# Patient Record
Sex: FEMALE | URBAN - METROPOLITAN AREA
[De-identification: names, ages, dates, MRNs, and addresses within clinical notes are randomized per-mention and may not be internally consistent; named-entity substitution may affect disease eponyms.]

---

## 2021-10-01 ENCOUNTER — OFFICE VISIT (OUTPATIENT)
Dept: URBAN - METROPOLITAN AREA CLINIC 56 | Facility: CLINIC | Age: 56
End: 2021-10-01

## 2021-10-01 DIAGNOSIS — D48.1 NEOPLASM OF UNCERTAIN BEHAVIOR OF CONNCTV/SOFT TISS: ICD-10-CM

## 2021-10-01 DIAGNOSIS — H02.422 MYOGENIC PTOSIS OF LEFT EYELID: Primary | ICD-10-CM

## 2021-10-01 DIAGNOSIS — Z41.1 ENCOUNTER FOR COSMETIC SURGERY: ICD-10-CM

## 2021-10-01 PROCEDURE — 92285 EXTERNAL OCULAR PHOTOGRAPHY: CPT | Performed by: OPHTHALMOLOGY

## 2021-10-01 PROCEDURE — 92081 LIMITED VISUAL FIELD XM: CPT | Performed by: OPHTHALMOLOGY

## 2021-10-01 NOTE — IMPRESSION/PLAN
Impression: Encounter for cosmetic surgery: Z41.1. 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 (42 years). PAtient does not currently have insurance, and I recommended that she acquire insurance prior to moving forward with surgery as this will lessen her out-of-pocket cost, especially if revision is needed, which frequently takes place in RGP-related upper eyelid ptosis. Patient explained that she will insurance and call to follow up.

## 2021-10-01 NOTE — IMPRESSION/PLAN
Impression: Myogenic ptosis of left eyelid: H02.422. 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 (42 years). PAtient does not currently have insurance, and I recommended that she acquire insurance prior to moving forward with surgery as this will lessen her out-of-pocket cost, especially if revision is needed, which frequently takes place in RGP-related upper eyelid ptosis. Patient explained that she will insurance and call to follow up.

## 2021-10-01 NOTE — IMPRESSION/PLAN
Impression: Neoplasm of uncertain behavior of connctv/soft tiss: D48.1. 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